# Patient Record
Sex: FEMALE | HISPANIC OR LATINO
[De-identification: names, ages, dates, MRNs, and addresses within clinical notes are randomized per-mention and may not be internally consistent; named-entity substitution may affect disease eponyms.]

---

## 2021-09-15 PROBLEM — Z00.00 ENCOUNTER FOR PREVENTIVE HEALTH EXAMINATION: Status: ACTIVE | Noted: 2021-09-15

## 2021-09-30 ENCOUNTER — APPOINTMENT (OUTPATIENT)
Dept: NEUROSURGERY | Facility: CLINIC | Age: 76
End: 2021-09-30

## 2021-10-15 ENCOUNTER — APPOINTMENT (OUTPATIENT)
Dept: NEUROSURGERY | Facility: CLINIC | Age: 76
End: 2021-10-15

## 2021-11-22 ENCOUNTER — APPOINTMENT (OUTPATIENT)
Dept: NEUROSURGERY | Facility: CLINIC | Age: 76
End: 2021-11-22
Payer: MEDICARE

## 2021-11-22 DIAGNOSIS — Q76.49 OTHER CONGENITAL MALFORMATIONS OF SPINE, NOT ASSOCIATED WITH SCOLIOSIS: ICD-10-CM

## 2021-11-22 DIAGNOSIS — G95.9 DISEASE OF SPINAL CORD, UNSPECIFIED: ICD-10-CM

## 2021-11-22 DIAGNOSIS — M54.2 CERVICALGIA: ICD-10-CM

## 2021-11-22 PROCEDURE — 99442: CPT | Mod: 95

## 2021-11-23 ENCOUNTER — APPOINTMENT (OUTPATIENT)
Dept: NEUROSURGERY | Facility: CLINIC | Age: 76
End: 2021-11-23

## 2021-11-24 PROBLEM — M54.2 PAIN IN NECK: Status: ACTIVE | Noted: 2021-11-24

## 2021-11-24 PROBLEM — Q76.49 SPINAL DEFORMITY: Status: ACTIVE | Noted: 2021-11-24

## 2021-11-24 PROBLEM — G95.9 MYELOPATHY: Status: ACTIVE | Noted: 2021-11-24

## 2021-11-26 NOTE — HISTORY OF PRESENT ILLNESS
[> 3 months] : more  than 3 months [FreeTextEntry1] : Back pain [de-identified] : She has several year history of low back pain that has gotten worse over the last few months. She had an Xray that showed a thoraco lumbar scoliosis. She has pain in the mid thoracic spins and lower back as well as on the hips. She has no bowel or bladder incontinence.

## 2021-11-26 NOTE — ASSESSMENT
[FreeTextEntry1] : She has a thoraco-lumbar degenerative scoliosis. She needs a thoracic and lumbar MRi as well as a standing xrays.

## 2022-06-14 ENCOUNTER — APPOINTMENT (OUTPATIENT)
Dept: NEUROSURGERY | Facility: CLINIC | Age: 77
End: 2022-06-14
Payer: MEDICARE

## 2022-06-14 VITALS
OXYGEN SATURATION: 93 % | HEIGHT: 61 IN | SYSTOLIC BLOOD PRESSURE: 137 MMHG | DIASTOLIC BLOOD PRESSURE: 80 MMHG | BODY MASS INDEX: 33.23 KG/M2 | HEART RATE: 72 BPM | WEIGHT: 176 LBS

## 2022-06-14 DIAGNOSIS — M54.41 LUMBAGO WITH SCIATICA, LEFT SIDE: ICD-10-CM

## 2022-06-14 DIAGNOSIS — M54.9 DORSALGIA, UNSPECIFIED: ICD-10-CM

## 2022-06-14 DIAGNOSIS — M54.42 LUMBAGO WITH SCIATICA, LEFT SIDE: ICD-10-CM

## 2022-06-14 DIAGNOSIS — G89.29 LUMBAGO WITH SCIATICA, LEFT SIDE: ICD-10-CM

## 2022-06-14 PROCEDURE — 99213 OFFICE O/P EST LOW 20 MIN: CPT

## 2022-06-20 PROBLEM — M54.9 PAIN IN BACK: Status: ACTIVE | Noted: 2022-06-20

## 2022-06-20 NOTE — HISTORY OF PRESENT ILLNESS
[> 3 months] : more  than 3 months [FreeTextEntry1] : Back pain [de-identified] : 6/14/22 She comes in complaining of low back pain about 9/10. She can only walk 3 blocks. She has to stop. She cannot stand for too long. SHe has not done PT and She had injections more than 3 years ago. She has no bowel or bladder incontinence. The best position in lying down or sitting.  She has tingling  and numbness of the feet.\par \par 11/22/21\par She has several year history of low back pain that has gotten worse over the last few months. She had an Xray that showed a thoraco lumbar scoliosis. She has pain in the mid thoracic spins and lower back as well as on the hips. She has no bowel or bladder incontinence.

## 2022-06-20 NOTE — PHYSICAL EXAM
[4] : 4/5 Great Toe Extension (L5) [No Deficits] : no sensory deficits [5] : 5/5 Ankle Plantar Flexion (S1) [Straight-Leg Raise Test - Left] : straight leg raise of the left leg was negative [Straight-Leg Raise Test - Right] : straight leg raise  of the right leg was negative